# Patient Record
(demographics unavailable — no encounter records)

---

## 2024-10-21 NOTE — DISCUSSION/SUMMARY
[FreeTextEntry1] : Health Maintenance:  -Pap with HPV -Mammo/sono Rx -TBSE -colonoscopy guidelines reviewed with patient  Osteopenia -DEXA referral  -Achieve Vit D levels of 30-40, intake of 1100 mg daily calcium mostly thru dark green leafy greens and milk products, exercise 30 minutes TIW

## 2024-10-21 NOTE — HISTORY OF PRESENT ILLNESS
[FreeTextEntry1] : 64 yo  postmenopausal female presents today for new gyn visit to establish GYN care  Menstrual triad: 13 x 28 x 5 LMP  No changes to bowel or bladder No PMB  OB:   F [id number]   F [id number]   F [id number]   PMHx:  Osteopenia  Pyelonephritis- septic, hospitalized x 11 d Basal cell (skin) Squamous cell (skin) Genetic testing negative (reviewed on HIE)-2023 Anxiety  Chronic constipation  [place] [facility] [place]    FHx:  Sister 1: (Kymberly) Breast cancer 64yo and ?Lymphoma - genetic testing negative  Sister 2 :(Mckenzie) Uterine cancer & Pancreatic cancer,  64 Sister 3: (Tiffanie) Pancreatic cancer,  54 Mother: Lung cancer   SH: Retired   Former smoker   Sexually active with  [Mammogramdate] : 11/3 [PapSmeardate] : 10/2023  [TextBox_31] : NL  [BoneDensityDate] : 2022 [TextBox_37] : Osteopenia  [ColonoscopyDate] : 2020  [TextBox_43] : due 2025

## 2024-10-21 NOTE — PHYSICAL EXAM
[Chaperone Present] : A chaperone was present in the examining room during all aspects of the physical examination [[id number]] : A chaperone was present during the pelvic exam. [Appropriately responsive] : appropriately responsive [Alert] : alert [No Acute Distress] : no acute distress [No Lymphadenopathy] : no lymphadenopathy [Soft] : soft [Non-tender] : non-tender [Oriented x3] : oriented x3 [Examination Of The Breasts] : a normal appearance [No Masses] : no breast masses were palpable [Labia Majora] : normal [Labia Minora] : normal [Normal] : normal [Uterine Adnexae] : normal [No Tenderness] : no tenderness [Nl Sphincter Tone] : normal sphincter tone [FreeTextEntry2] : Yareli Quinones PA-C [FreeTextEntry5] : pap taken  [FreeTextEntry9] : stool guiac neg

## 2024-10-29 NOTE — PHYSICAL EXAM
[Alert] : alert [Normal Voice/Communication] : normal voice/communication [Healthy Appearing] : healthy appearing [No Acute Distress] : no acute distress [Sclera] : the sclera and conjunctiva were normal [Hearing Threshold Finger Rub Not [place]] : hearing was normal [Normal Lips/Gums] : the lips and gums were normal [Normal Appearance] : the appearance of the neck was normal [No Neck Mass] : no neck mass was observed [No Respiratory Distress] : no respiratory distress [No Acc Muscle Use] : no accessory muscle use [Respiration, Rhythm And Depth] : normal respiratory rhythm and effort [Auscultation Breath Sounds / Voice Sounds] : lungs were clear to auscultation bilaterally [Heart Rate And Rhythm] : heart rate was normal and rhythm regular [Normal S1, S2] : normal S1 and S2 [Bowel Sounds] : normal bowel sounds [No Masses] : no abdominal mass palpated [Abdomen Soft] : soft [] : no hepatosplenomegaly [RUQ] : RUQ [LUQ] : LUQ [Abnormal Walk] : normal gait [Oriented To Time, Place, And Person] : oriented to person, place, and time [de-identified] : ambulates independently

## 2024-10-29 NOTE — REVIEW OF SYSTEMS
[Abdominal Pain] : abdominal pain [Vomiting] : no vomiting [Constipation] : constipation [Diarrhea] : no diarrhea [Heartburn] : heartburn [Melena (black stool)] : no melena [Bleeding] : no bleeding [Fecal Incontinence (soiling)] : no fecal incontinence [Bloating (gassiness)] : bloating [Negative] : Heme/Lymph [FreeTextEntry7] : LUQ pain worse with constipation

## 2024-10-29 NOTE — HISTORY OF PRESENT ILLNESS
[FreeTextEntry1] : Connie Sanchez is a 63-year-old F with PMHx IBS-c presents to the office for evaluation of chronic constipation and to re-establish care.   Pt reports lifelong hx of constipation, dx with IBS-c. Has trialed different regimens of OTC meds without relief (though admits to not trialing consistently). She endorses 1 BM Q 2-4 days that is hard and painful. She endorses straining and that this is worse lately given an increase in stress. This is a/w dull LUQ pain that is worse when constipated. She also reports intermittent heartburn and churning after PO intake despite dietary modifications and reports some intermittent dysphagia. Denies diarrhea, melena, hematochezia.   Last colonoscopy was 2020 with finding of sessile polyp pathology unspecified. No FH CRC.

## 2024-10-29 NOTE — ASSESSMENT
[FreeTextEntry1] : 63-year-old F presents for evaluation of IBS-c and colon cancer screening.  Plan: # IBS-c: Given multi-year long hx of constipation without relief from inconsistent OTC remedies will recommend more regimented routine. Pt advised to perform magnesium citrate 1/2 bottle "clean out" (with instructions to repeat 1/2 bottle if ineffective) followed by initiation of daily Miralax + supplemental fiber beginning the following day. Advised that Miralax can be titrated to BM's and fiber can be slowly increased to avoid bloating/gas. Pt encouraged to call NP in 1-2 weeks to discuss symptoms and adjust plan as necessary.  - PRN Iberogast supplement recommended for bloating  # Colon cancer screening: Given several years since last colonoscopy + unknown pathology (pt cannot recall) will recommend colonoscopy. Risks vs. benefits as well as instructions given. Pt agrees to schedule procedure, Suprep  given for preparation, written instructions provided. All questions answered. - 2-day suprep recommended in setting of chronic constipation   # Heartburn: given increase in GERD symptoms despite strict dietary modifications will start empiric PPI treatment and recommend EGD added to COL.   Pt agrees to plan of care, verbalizes understanding of instructions. Advised to call office sooner if symptoms worsen or change.

## 2025-03-28 NOTE — HISTORY OF PRESENT ILLNESS
[de-identified] : Presents for follow-up fasting labs and medication management. She does have a history of hyperlipidemia and after her last visit she was going to start Lipitor in conjunction with with the Zetia that she is already taking. She states she took over a few days and then stopped because she was concerned about the possibility of it causing joint pains. Has been well otherwise without any recent illness. Did recently have endoscopy found to have gastritis and is presently on omeprazole 40 mg daily.

## 2025-03-28 NOTE — ASSESSMENT
[FreeTextEntry1] : [id number] lipid profile was sent. For now she will just continue with Zetia 10 mg daily. Did discuss the possibility of trying Livalo if the cholesterol remains elevated. She is also interested in trying to stop this at a as well to see where her cholesterol is at off medication. She was told if she does try to do that to follow-up 3 months after for repeat lab testing.  History of gastritis-did have recent endoscopy. Presently on omeprazole 40 mg daily.  Anxiety-told she can use Xanax 0.25 mg as needed.  She also has an upcoming plane flight and has anxiety with flying and was told she can certainly use it for that as well.

## 2025-03-29 NOTE — PHYSICAL EXAM

## 2025-03-29 NOTE — HISTORY OF PRESENT ILLNESS
[FreeTextEntry1] : 64yo female f/u GERD, constipation  She is doing much better on pantoprazole than prior meds for her GERD  She notes some recurrent issues with constipation

## 2025-03-29 NOTE — PHYSICAL EXAM

## 2025-03-29 NOTE — ASSESSMENT
[FreeTextEntry1] : 62yo female with GERD, constipation  GERD - will continue pantoprazole for now we discussed potential tapering off in several months and then converting to on-demand therapy with daily famotidine  will add docusate daily for her constipation

## 2025-03-29 NOTE — ASSESSMENT
[FreeTextEntry1] : 64yo female with GERD, constipation  GERD - will continue pantoprazole for now we discussed potential tapering off in several months and then converting to on-demand therapy with daily famotidine  will add docusate daily for her constipation

## 2025-03-29 NOTE — HISTORY OF PRESENT ILLNESS
[FreeTextEntry1] : 62yo female f/u GERD, constipation  She is doing much better on pantoprazole than prior meds for her GERD  She notes some recurrent issues with constipation

## 2025-04-28 NOTE — HISTORY OF PRESENT ILLNESS
[FreeTextEntry8] : Presents with a few day history of some low back discomfort and is concerned about the possibility of a urinary tract infection because she will be traveling to [place] in the next few days. Denies any fever/chills.  No real discomfort with urination.  No blood. Does have a history of lower back pain/arthritis.

## 2025-04-28 NOTE — ASSESSMENT
[FreeTextEntry1] : Back pain-her urinalysis is unremarkable and she has no other symptoms that would be consistent with urinary tract infection. Was told this is most likely related more to her ongoing lower back issues.  Anxiety-she will be flying to [place] next few days.  States she has not flown in many years and it is creating increased anxiety. Will use Xanax 0.25 mg 1/2-2 tablets as needed prior to flying.

## 2025-06-22 NOTE — PHYSICAL EXAM

## 2025-06-22 NOTE — ASSESSMENT
[FreeTextEntry1] : 62yo female with GERD, bloating and constipation  try famotidine 40mg bid, add align

## 2025-06-22 NOTE — ASSESSMENT
[FreeTextEntry1] : 64yo female with GERD, bloating and constipation  try famotidine 40mg bid, add align

## 2025-06-22 NOTE — HISTORY OF PRESENT ILLNESS
[FreeTextEntry1] : 64yo female with GERD  She went off Omeprazole as concerned for skin rash when in the sun She has been off meds and has noted recurrence of GERD symptoms with increased stress pepcid prn can help a bit  She notes that constipation has been a little better

## 2025-06-22 NOTE — PHYSICAL EXAM
[Alert] : alert [Normal Voice/Communication] : normal voice/communication [Healthy Appearing] : healthy appearing [No Acute Distress] : no acute distress [Sclera] : the sclera and conjunctiva were normal [Hearing Threshold Finger Rub Not [place]] : hearing was normal [Normal Lips/Gums] : the lips and gums were normal [Oropharynx] : the oropharynx was normal [Normal Appearance] : the appearance of the neck was normal [No Neck Mass] : no neck mass was observed [No Respiratory Distress] : no respiratory distress [No Acc Muscle Use] : no accessory muscle use [Respiration, Rhythm And Depth] : normal respiratory rhythm and effort [Auscultation Breath Sounds / Voice Sounds] : lungs were clear to auscultation bilaterally [Heart Rate And Rhythm] : heart rate was normal and rhythm regular [Normal S1, S2] : normal S1 and S2 [Murmurs] : no murmurs [Bowel Sounds] : normal bowel sounds [Abdomen Tenderness] : non-tender [No Masses] : no abdominal mass palpated [Abdomen Soft] : soft [Oriented To Time, Place, And Person] : oriented to person, place, and time [] : no hepatosplenomegaly

## 2025-06-22 NOTE — HISTORY OF PRESENT ILLNESS
[FreeTextEntry1] : 62yo female with GERD  She went off Omeprazole as concerned for skin rash when in the sun She has been off meds and has noted recurrence of GERD symptoms with increased stress pepcid prn can help a bit  She notes that constipation has been a little better